# Patient Record
Sex: FEMALE | Race: WHITE | NOT HISPANIC OR LATINO | Employment: OTHER | URBAN - METROPOLITAN AREA
[De-identification: names, ages, dates, MRNs, and addresses within clinical notes are randomized per-mention and may not be internally consistent; named-entity substitution may affect disease eponyms.]

---

## 2017-09-11 ENCOUNTER — RESULTS ENCOUNTER (OUTPATIENT)
Dept: SPORTS MEDICINE | Facility: CLINIC | Age: 52
End: 2017-09-11

## 2017-09-11 ENCOUNTER — OFFICE VISIT (OUTPATIENT)
Dept: SPORTS MEDICINE | Facility: CLINIC | Age: 52
End: 2017-09-11

## 2017-09-11 VITALS
DIASTOLIC BLOOD PRESSURE: 60 MMHG | OXYGEN SATURATION: 98 % | SYSTOLIC BLOOD PRESSURE: 100 MMHG | BODY MASS INDEX: 21.07 KG/M2 | HEIGHT: 68 IN | WEIGHT: 139 LBS | HEART RATE: 70 BPM

## 2017-09-11 DIAGNOSIS — Z12.11 SCREEN FOR COLON CANCER: ICD-10-CM

## 2017-09-11 DIAGNOSIS — Z00.00 PREVENTATIVE HEALTH CARE: Primary | ICD-10-CM

## 2017-09-11 DIAGNOSIS — Z23 IMMUNIZATION DUE: ICD-10-CM

## 2017-09-11 DIAGNOSIS — Z11.59 NEED FOR HEPATITIS C SCREENING TEST: ICD-10-CM

## 2017-09-11 PROCEDURE — 90686 IIV4 VACC NO PRSV 0.5 ML IM: CPT | Performed by: FAMILY MEDICINE

## 2017-09-11 PROCEDURE — 90471 IMMUNIZATION ADMIN: CPT | Performed by: FAMILY MEDICINE

## 2017-09-11 PROCEDURE — 99396 PREV VISIT EST AGE 40-64: CPT | Performed by: FAMILY MEDICINE

## 2017-09-11 NOTE — PROGRESS NOTES
"Tish is a 52 y.o. year old female    Chief Complaint   Patient presents with   • Annual Exam       History of Present Illness   HPI     I have reviewed the patient's medical, family, and social history in detail and updated the computerized patient record.    Review of Systems    /60  Pulse 70  Ht 68\" (172.7 cm)  Wt 139 lb (63 kg)  SpO2 98%  BMI 21.13 kg/m2     Physical Exam     There are no diagnoses linked to this encounter.  "

## 2017-09-11 NOTE — PROGRESS NOTES
"Tish Urias is here today for an annual physical exam.     Eating a healthy diet. Exercising routinely.       I have reviewed the patient's medical, family, and social history in detail and updated the computerized patient record.    Screening history:  Colonoscopy - defers but agrees to Cologuard  Breast cancer, Pap/pelvic - per GYN  Metabolic - last year    Health Maintenance   Topic Date Due   • TDAP/TD VACCINES (1 - Tdap) 08/23/1982   • MAMMOGRAM  03/09/2016   • COLONOSCOPY  03/09/2016   • INFLUENZA VACCINE  08/01/2017   • HEPATITIS C SCREENING  09/11/2017   • PAP SMEAR  09/11/2017       Review of Systems   Constitutional: Negative for activity change, appetite change, chills, diaphoresis, fatigue, fever and unexpected weight change.   HENT: Negative for congestion, ear pain, postnasal drip, rhinorrhea, sinus pressure, sneezing, sore throat and trouble swallowing.    Eyes: Negative for visual disturbance.   Respiratory: Negative for cough, chest tightness, shortness of breath and wheezing.    Cardiovascular: Negative for chest pain and palpitations.   Gastrointestinal: Negative for abdominal pain, blood in stool, nausea and vomiting.   Endocrine: Negative for cold intolerance, polydipsia, polyphagia and polyuria.   Genitourinary: Negative for dysuria, flank pain, frequency, hematuria and urgency.   Musculoskeletal: Negative for arthralgias, back pain, joint swelling and myalgias.   Skin: Negative for rash.   Allergic/Immunologic: Negative for environmental allergies.   Neurological: Negative for dizziness, syncope, weakness, numbness and headaches.   Hematological: Negative for adenopathy. Does not bruise/bleed easily.   Psychiatric/Behavioral: Negative for agitation, decreased concentration, dysphoric mood, sleep disturbance and suicidal ideas. The patient is not nervous/anxious.        /60  Pulse 70  Ht 68\" (172.7 cm)  Wt 139 lb (63 kg)  SpO2 98%  BMI 21.13 kg/m2     Physical Exam    Vital signs " reviewed.  General appearance: No acute distress  Eyes: conjunctiva clear without erythema; pupils equally round and reactive  ENT: external ears and nose normal; hearing normal, oropharynx clear  Neck: supple; no thyromegaly  CV: normal rate and rhythm; no peripheral edema  Respiratory: normal respiratory effort; lungs clear to auscultation bilaterally  MSK: normal gait and station; no focal joint deformity or swelling  Skin: no rash or wounds; normal turgor  Neuro: cranial nerves 2-12 grossly intact; normal sensation to light touch  Psych: mood and affect normal; recent and remote memory intact    Tish was seen today for annual exam.    Diagnoses and all orders for this visit:    Preventative health care  -     CBC & Differential  -     Comprehensive Metabolic Panel  -     Lipid Panel With / Chol / HDL Ratio  -     T4, Free  -     TSH  -     UA / M With / Rflx Culture([facility] ONLY)  -     Hepatitis C Antibody    Screen for colon cancer  -     Cologuard; Future    Need for hepatitis C screening test  -     Hepatitis C Antibody      Encourage healthy diet and exercise.  Encourage patient to stay up to date on screening examinations as indicated based on age and risk factors.

## 2017-09-13 LAB
ALBUMIN SERPL-MCNC: 4.6 G/DL (ref 3.5–5.2)
ALBUMIN/GLOB SERPL: 2.1 G/DL
ALP SERPL-CCNC: 94 U/L (ref 39–117)
ALT SERPL-CCNC: 19 U/L (ref 1–33)
APPEARANCE UR: CLEAR
AST SERPL-CCNC: 19 U/L (ref 1–32)
BACTERIA #/AREA URNS HPF: NORMAL /HPF
BACTERIA UR CULT: NORMAL
BACTERIA UR CULT: NORMAL
BASOPHILS # BLD AUTO: 0.06 10*3/MM3 (ref 0–0.2)
BASOPHILS NFR BLD AUTO: 1.3 % (ref 0–1.5)
BILIRUB SERPL-MCNC: 1.1 MG/DL (ref 0.1–1.2)
BILIRUB UR QL STRIP: NEGATIVE
BUN SERPL-MCNC: 14 MG/DL (ref 6–20)
BUN/CREAT SERPL: 17.3 (ref 7–25)
CALCIUM SERPL-MCNC: 9.5 MG/DL (ref 8.6–10.5)
CHLORIDE SERPL-SCNC: 101 MMOL/L (ref 98–107)
CHOLEST SERPL-MCNC: 197 MG/DL (ref 0–200)
CHOLEST/HDLC SERPL: 3.58 {RATIO}
CO2 SERPL-SCNC: 28.8 MMOL/L (ref 22–29)
COLOR UR: YELLOW
CREAT SERPL-MCNC: 0.81 MG/DL (ref 0.57–1)
EOSINOPHIL # BLD AUTO: 0.1 10*3/MM3 (ref 0–0.7)
EOSINOPHIL NFR BLD AUTO: 2.2 % (ref 0.3–6.2)
EPI CELLS #/AREA URNS HPF: NORMAL /HPF
ERYTHROCYTE [DISTWIDTH] IN BLOOD BY AUTOMATED COUNT: 12.4 % (ref 11.7–13)
GLOBULIN SER CALC-MCNC: 2.2 GM/DL
GLUCOSE SERPL-MCNC: 91 MG/DL (ref 65–99)
GLUCOSE UR QL: NEGATIVE
HCT VFR BLD AUTO: 41.6 % (ref 35.6–45.5)
HCV AB S/CO SERPL IA: 0.2 S/CO RATIO (ref 0–0.9)
HDLC SERPL-MCNC: 55 MG/DL (ref 40–60)
HGB BLD-MCNC: 13.5 G/DL (ref 11.9–15.5)
HGB UR QL STRIP: NEGATIVE
IMM GRANULOCYTES # BLD: 0 10*3/MM3 (ref 0–0.03)
IMM GRANULOCYTES NFR BLD: 0 % (ref 0–0.5)
KETONES UR QL STRIP: NEGATIVE
LDLC SERPL CALC-MCNC: 112 MG/DL (ref 0–100)
LEUKOCYTE ESTERASE UR QL STRIP: ABNORMAL
LYMPHOCYTES # BLD AUTO: 1.74 10*3/MM3 (ref 0.9–4.8)
LYMPHOCYTES NFR BLD AUTO: 38.1 % (ref 19.6–45.3)
MCH RBC QN AUTO: 31.1 PG (ref 26.9–32)
MCHC RBC AUTO-ENTMCNC: 32.5 G/DL (ref 32.4–36.3)
MCV RBC AUTO: 95.9 FL (ref 80.5–98.2)
MICRO URNS: ABNORMAL
MONOCYTES # BLD AUTO: 0.43 10*3/MM3 (ref 0.2–1.2)
MONOCYTES NFR BLD AUTO: 9.4 % (ref 5–12)
NEUTROPHILS # BLD AUTO: 2.24 10*3/MM3 (ref 1.9–8.1)
NEUTROPHILS NFR BLD AUTO: 49 % (ref 42.7–76)
NITRITE UR QL STRIP: NEGATIVE
PH UR STRIP: 6.5 [PH] (ref 5–7.5)
PLATELET # BLD AUTO: 214 10*3/MM3 (ref 140–500)
POTASSIUM SERPL-SCNC: 4.3 MMOL/L (ref 3.5–5.2)
PROT SERPL-MCNC: 6.8 G/DL (ref 6–8.5)
PROT UR QL STRIP: NEGATIVE
RBC # BLD AUTO: 4.34 10*6/MM3 (ref 3.9–5.2)
RBC #/AREA URNS HPF: NORMAL /HPF
SODIUM SERPL-SCNC: 142 MMOL/L (ref 136–145)
SP GR UR: 1.01 (ref 1–1.03)
T4 FREE SERPL-MCNC: 1.26 NG/DL (ref 0.93–1.7)
TRIGL SERPL-MCNC: 152 MG/DL (ref 0–150)
TSH SERPL DL<=0.005 MIU/L-ACNC: 2.91 MIU/ML (ref 0.27–4.2)
URINALYSIS REFLEX: ABNORMAL
UROBILINOGEN UR STRIP-MCNC: 0.2 MG/DL (ref 0.2–1)
VLDLC SERPL CALC-MCNC: 30.4 MG/DL (ref 5–40)
WBC # BLD AUTO: 4.57 10*3/MM3 (ref 4.5–10.7)
WBC #/AREA URNS HPF: NORMAL /HPF

## 2019-11-06 ENCOUNTER — TELEPHONE (OUTPATIENT)
Dept: SPORTS MEDICINE | Facility: CLINIC | Age: 54
End: 2019-11-06

## 2019-11-06 RX ORDER — PREDNISONE 20 MG/1
TABLET ORAL
Qty: 9 TABLET | Refills: 0 | Status: SHIPPED | OUTPATIENT
Start: 2019-11-06 | End: 2019-11-12

## 2019-11-06 NOTE — TELEPHONE ENCOUNTER
Patient called and wants to get a prescription for her poison Ivy that she has not been able to get rid of for a week. Please advise, thank you.

## 2021-11-11 ENCOUNTER — TELEPHONE (OUTPATIENT)
Dept: SPORTS MEDICINE | Facility: CLINIC | Age: 56
End: 2021-11-11

## 2021-11-11 NOTE — TELEPHONE ENCOUNTER
Patient called wanting to make an appointment.  Hasn't seen you since 2017.  She wanted to see if you would still see her for primary care

## 2021-11-18 ENCOUNTER — TELEPHONE (OUTPATIENT)
Dept: SPORTS MEDICINE | Facility: CLINIC | Age: 56
End: 2021-11-18

## 2021-11-18 ENCOUNTER — OFFICE VISIT (OUTPATIENT)
Dept: SPORTS MEDICINE | Facility: CLINIC | Age: 56
End: 2021-11-18

## 2021-11-18 VITALS
WEIGHT: 157 LBS | OXYGEN SATURATION: 98 % | DIASTOLIC BLOOD PRESSURE: 72 MMHG | HEIGHT: 68 IN | RESPIRATION RATE: 16 BRPM | TEMPERATURE: 97.4 F | SYSTOLIC BLOOD PRESSURE: 118 MMHG | BODY MASS INDEX: 23.79 KG/M2 | HEART RATE: 72 BPM

## 2021-11-18 DIAGNOSIS — Z23 FLU VACCINE NEED: ICD-10-CM

## 2021-11-18 DIAGNOSIS — Z12.11 SCREEN FOR COLON CANCER: ICD-10-CM

## 2021-11-18 DIAGNOSIS — Z00.00 PREVENTATIVE HEALTH CARE: Primary | ICD-10-CM

## 2021-11-18 DIAGNOSIS — E01.0 THYROMEGALY: ICD-10-CM

## 2021-11-18 PROCEDURE — 90686 IIV4 VACC NO PRSV 0.5 ML IM: CPT | Performed by: FAMILY MEDICINE

## 2021-11-18 PROCEDURE — 90471 IMMUNIZATION ADMIN: CPT | Performed by: FAMILY MEDICINE

## 2021-11-18 PROCEDURE — 99386 PREV VISIT NEW AGE 40-64: CPT | Performed by: FAMILY MEDICINE

## 2021-11-18 NOTE — TELEPHONE ENCOUNTER
Patient called and wants to know the name of the cream you recommended for her shoulder.  Please advise, thank you.

## 2021-11-18 NOTE — PROGRESS NOTES
"Tish Urias is here today for an annual physical exam.     Her and sister taking care of parents who have dementia     PHQ-2 Depression Screening  Little interest or pleasure in doing things?  0   Feeling down, depressed, or hopeless?  0   PHQ-2 Total Score  0           I have reviewed the patient's medical, family, and social history in detail and updated the computerized patient record.    Screening history:  Colonoscopy - due, prefers Cologuard   Breast cancer, Pap/pelvic - per GYN  Metabolic - last year    Health Maintenance   Topic Date Due   • Annual Gynecologic Pelvic and Breast Exam  Never done   • TDAP/TD VACCINES (1 - Tdap) Never done   • ZOSTER VACCINE (1 of 2) Never done   • MAMMOGRAM  Never done   • PAP SMEAR  Never done   • COLORECTAL CANCER SCREENING  10/17/2017   • ANNUAL PHYSICAL  09/12/2018   • INFLUENZA VACCINE  08/01/2021   • HEPATITIS C SCREENING  Completed   • COVID-19 Vaccine  Completed   • Pneumococcal Vaccine 0-64  Aged Out       Review of Systems    /72 (BP Location: Right arm, Patient Position: Sitting, Cuff Size: Adult)   Pulse 72   Temp 97.4 °F (36.3 °C) (Temporal)   Resp 16   Ht 172.7 cm (67.99\")   Wt 71.2 kg (157 lb)   SpO2 98%   BMI 23.88 kg/m²      Physical Exam    Vital signs reviewed.  General appearance: No acute distress  Eyes: conjunctiva clear without erythema; pupils equally round and reactive  ENT: external ears normal; hearing normal  Neck: supple; + thyromegaly right sided  CV: normal rate and rhythm; no peripheral edema  Respiratory: normal respiratory effort; lungs clear to auscultation bilaterally  MSK: normal gait and station; no focal joint deformity or swelling  Skin: no rash or wounds; normal turgor  Neuro: cranial nerves 2-12 grossly intact; normal sensation to light touch  Psych: mood and affect normal; recent and remote memory intact    No visits with results within 2 Week(s) from this visit.   Latest known visit with results is:   Office Visit on " 09/11/2017   Component Date Value Ref Range Status   • WBC 09/11/2017 4.57  4.50 - 10.70 10*3/mm3 Final   • RBC 09/11/2017 4.34  3.90 - 5.20 10*6/mm3 Final   • Hemoglobin 09/11/2017 13.5  11.9 - 15.5 g/dL Final   • Hematocrit 09/11/2017 41.6  35.6 - 45.5 % Final   • MCV 09/11/2017 95.9  80.5 - 98.2 fL Final   • MCH 09/11/2017 31.1  26.9 - 32.0 pg Final   • MCHC 09/11/2017 32.5  32.4 - 36.3 g/dL Final   • RDW 09/11/2017 12.4  11.7 - 13.0 % Final   • Platelets 09/11/2017 214  140 - 500 10*3/mm3 Final   • Neutrophil Rel % 09/11/2017 49.0  42.7 - 76.0 % Final   • Lymphocyte Rel % 09/11/2017 38.1  19.6 - 45.3 % Final   • Monocyte Rel % 09/11/2017 9.4  5.0 - 12.0 % Final   • Eosinophil Rel % 09/11/2017 2.2  0.3 - 6.2 % Final   • Basophil Rel % 09/11/2017 1.3  0.0 - 1.5 % Final   • Neutrophils Absolute 09/11/2017 2.24  1.90 - 8.10 10*3/mm3 Final   • Lymphocytes Absolute 09/11/2017 1.74  0.90 - 4.80 10*3/mm3 Final   • Monocytes Absolute 09/11/2017 0.43  0.20 - 1.20 10*3/mm3 Final   • Eosinophils Absolute 09/11/2017 0.10  0.00 - 0.70 10*3/mm3 Final   • Basophils Absolute 09/11/2017 0.06  0.00 - 0.20 10*3/mm3 Final   • Immature Granulocyte Rel % 09/11/2017 0.0  0.0 - 0.5 % Final   • Immature Grans Absolute 09/11/2017 0.00  0.00 - 0.03 10*3/mm3 Final   • Glucose 09/11/2017 91  65 - 99 mg/dL Final   • BUN 09/11/2017 14  6 - 20 mg/dL Final   • Creatinine 09/11/2017 0.81  0.57 - 1.00 mg/dL Final   • eGFR Non  Am 09/11/2017 74  >60 mL/min/1.73 Final   • eGFR African Am 09/11/2017 89  >60 mL/min/1.73 Final   • BUN/Creatinine Ratio 09/11/2017 17.3  7.0 - 25.0 Final   • Sodium 09/11/2017 142  136 - 145 mmol/L Final   • Potassium 09/11/2017 4.3  3.5 - 5.2 mmol/L Final   • Chloride 09/11/2017 101  98 - 107 mmol/L Final   • Total CO2 09/11/2017 28.8  22.0 - 29.0 mmol/L Final   • Calcium 09/11/2017 9.5  8.6 - 10.5 mg/dL Final   • Total Protein 09/11/2017 6.8  6.0 - 8.5 g/dL Final   • Albumin 09/11/2017 4.60  3.50 - 5.20 g/dL Final    • Globulin 09/11/2017 2.2  gm/dL Final   • A/G Ratio 09/11/2017 2.1  g/dL Final   • Total Bilirubin 09/11/2017 1.1  0.1 - 1.2 mg/dL Final   • Alkaline Phosphatase 09/11/2017 94  39 - 117 U/L Final   • AST (SGOT) 09/11/2017 19  1 - 32 U/L Final   • ALT (SGPT) 09/11/2017 19  1 - 33 U/L Final   • Total Cholesterol 09/11/2017 197  0 - 200 mg/dL Final   • Triglycerides 09/11/2017 152* 0 - 150 mg/dL Final   • HDL Cholesterol 09/11/2017 55  40 - 60 mg/dL Final   • VLDL Cholesterol 09/11/2017 30.4  5 - 40 mg/dL Final   • LDL Cholesterol  09/11/2017 112* 0 - 100 mg/dL Final   • Chol/HDL Ratio 09/11/2017 3.58   Final   • Free T4 09/11/2017 1.26  0.93 - 1.70 ng/dL Final   • TSH 09/11/2017 2.910  0.270 - 4.200 mIU/mL Final   • Specific Gravity, UA 09/11/2017 1.010  1.005 - 1.030 Final   • pH, UA 09/11/2017 6.5  5.0 - 7.5 Final   • Color, UA 09/11/2017 Yellow  Yellow Final   • Appearance, UA 09/11/2017 Clear  Clear Final   • Leukocytes, UA 09/11/2017 Trace* Negative Final   • Protein 09/11/2017 Negative  Negative/Trace Final   • Glucose, UA 09/11/2017 Negative  Negative Final   • Ketones 09/11/2017 Negative  Negative Final   • Blood, UA 09/11/2017 Negative  Negative Final   • Bilirubin, UA 09/11/2017 Negative  Negative Final   • Urobilinogen, UA 09/11/2017 0.2  0.2 - 1.0 mg/dL Final   • Nitrite, UA 09/11/2017 Negative  Negative Final   • Microscopic Examination 09/11/2017 See below:   Final    Microscopic was indicated and was performed.   • Urinalysis Reflex 09/11/2017 Comment   Final    This specimen has reflexed to a Urine Culture.   • Hep C Virus Ab 09/11/2017 0.2  0.0 - 0.9 s/co ratio Final    Comment:                                   Negative:     < 0.8                               Indeterminate: 0.8 - 0.9                                    Positive:     > 0.9   The CDC recommends that a positive HCV antibody result   be followed up with a HCV Nucleic Acid Amplification   test (020860).     • WBC, UA 09/11/2017 0-5  0  - 5 /hpf Final   • RBC, UA 09/11/2017 None seen  0 - 2 /hpf Final   • Epithelial Cells (non renal) 09/11/2017 0-10  0 - 10 /hpf Final   • Bacteria, UA 09/11/2017 Few  None seen/Few /hpf Final   • Urine Culture 09/11/2017 Final report   Final   • Result 1 09/11/2017 Comment   Final    Comment: Culture shows less than 10,000 colony forming units of bacteria per  milliliter of urine. This colony count is not generally considered  to be clinically significant.           Current Outpatient Medications:   •  cholecalciferol (VITAMIN D3) 1000 UNITS tablet, Take 1,000 Units by mouth daily., Disp: , Rfl:   •  ELDERBERRY PO, Take  by mouth., Disp: , Rfl:   •  Multiple Vitamins-Minerals (MULTI COMPLETE PO), Take  by mouth., Disp: , Rfl:   •  vitamin C (ASCORBIC ACID) 500 MG tablet, Take 500 mg by mouth daily., Disp: , Rfl:     Diagnoses and all orders for this visit:    1. Preventative health care (Primary)  -     CBC & Differential  -     Comprehensive Metabolic Panel  -     Lipid Panel With / Chol / HDL Ratio  -     TSH  -     T4, Free  -     UA / M With / Rflx Culture([facility] ONLY) - Urine, Clean Catch    2. Thyromegaly  -     T3, Free  -     Thyroid Antibodies  -     US thyroid; Future    3. Screen for colon cancer  -     Cologuard - Stool, Per Rectum; Future        Encourage healthy diet and exercise.  Encourage patient to stay up to date on screening examinations as indicated based on age and risk factors.    EMR Dragon/Transcription disclaimer:    Much of this encounter note is an electronic transcription/translation of spoken language to printed text.  The electronic translation of spoken language may permit erroneous, or at times, nonsensical words or phrases to be inadvertently transcribed.  Although I have reviewed the note for such errors some may still exist.

## 2021-11-20 LAB
ALBUMIN SERPL-MCNC: 4.6 G/DL (ref 3.8–4.9)
ALBUMIN/GLOB SERPL: 2.1 {RATIO} (ref 1.2–2.2)
ALP SERPL-CCNC: 96 IU/L (ref 44–121)
ALT SERPL-CCNC: 19 IU/L (ref 0–32)
APPEARANCE UR: CLEAR
AST SERPL-CCNC: 23 IU/L (ref 0–40)
BACTERIA #/AREA URNS HPF: NORMAL /[HPF]
BASOPHILS # BLD AUTO: 0.1 X10E3/UL (ref 0–0.2)
BASOPHILS NFR BLD AUTO: 2 %
BILIRUB SERPL-MCNC: 0.7 MG/DL (ref 0–1.2)
BILIRUB UR QL STRIP: NEGATIVE
BUN SERPL-MCNC: 14 MG/DL (ref 6–24)
BUN/CREAT SERPL: 19 (ref 9–23)
CALCIUM SERPL-MCNC: 9.5 MG/DL (ref 8.7–10.2)
CASTS URNS QL MICRO: NORMAL /LPF
CHLORIDE SERPL-SCNC: 103 MMOL/L (ref 96–106)
CHOLEST SERPL-MCNC: 226 MG/DL (ref 100–199)
CHOLEST/HDLC SERPL: 3.8 RATIO (ref 0–4.4)
CO2 SERPL-SCNC: 24 MMOL/L (ref 20–29)
COLOR UR: YELLOW
CREAT SERPL-MCNC: 0.72 MG/DL (ref 0.57–1)
EOSINOPHIL # BLD AUTO: 0.1 X10E3/UL (ref 0–0.4)
EOSINOPHIL NFR BLD AUTO: 3 %
EPI CELLS #/AREA URNS HPF: NORMAL /HPF (ref 0–10)
ERYTHROCYTE [DISTWIDTH] IN BLOOD BY AUTOMATED COUNT: 12.2 % (ref 11.7–15.4)
GLOBULIN SER CALC-MCNC: 2.2 G/DL (ref 1.5–4.5)
GLUCOSE SERPL-MCNC: 101 MG/DL (ref 65–99)
GLUCOSE UR QL: NEGATIVE
HCT VFR BLD AUTO: 41.8 % (ref 34–46.6)
HDLC SERPL-MCNC: 59 MG/DL
HGB BLD-MCNC: 13.6 G/DL (ref 11.1–15.9)
HGB UR QL STRIP: NEGATIVE
IMM GRANULOCYTES # BLD AUTO: 0 X10E3/UL (ref 0–0.1)
IMM GRANULOCYTES NFR BLD AUTO: 0 %
KETONES UR QL STRIP: NEGATIVE
LDLC SERPL CALC-MCNC: 153 MG/DL (ref 0–99)
LEUKOCYTE ESTERASE UR QL STRIP: NEGATIVE
LYMPHOCYTES # BLD AUTO: 1.7 X10E3/UL (ref 0.7–3.1)
LYMPHOCYTES NFR BLD AUTO: 39 %
MCH RBC QN AUTO: 30.5 PG (ref 26.6–33)
MCHC RBC AUTO-ENTMCNC: 32.5 G/DL (ref 31.5–35.7)
MCV RBC AUTO: 94 FL (ref 79–97)
MICRO URNS: NORMAL
MICRO URNS: NORMAL
MONOCYTES # BLD AUTO: 0.5 X10E3/UL (ref 0.1–0.9)
MONOCYTES NFR BLD AUTO: 11 %
NEUTROPHILS # BLD AUTO: 2 X10E3/UL (ref 1.4–7)
NEUTROPHILS NFR BLD AUTO: 45 %
NITRITE UR QL STRIP: NEGATIVE
PH UR STRIP: 6 [PH] (ref 5–7.5)
PLATELET # BLD AUTO: 290 X10E3/UL (ref 150–450)
POTASSIUM SERPL-SCNC: 4.2 MMOL/L (ref 3.5–5.2)
PROT SERPL-MCNC: 6.8 G/DL (ref 6–8.5)
PROT UR QL STRIP: NEGATIVE
RBC # BLD AUTO: 4.46 X10E6/UL (ref 3.77–5.28)
RBC #/AREA URNS HPF: NORMAL /HPF (ref 0–2)
SODIUM SERPL-SCNC: 142 MMOL/L (ref 134–144)
SP GR UR: 1.02 (ref 1–1.03)
T3FREE SERPL-MCNC: 3.2 PG/ML (ref 2–4.4)
T4 FREE SERPL-MCNC: 1.37 NG/DL (ref 0.82–1.77)
THYROGLOB AB SERPL-ACNC: <1 IU/ML (ref 0–0.9)
THYROPEROXIDASE AB SERPL-ACNC: 10 IU/ML (ref 0–34)
TRIGL SERPL-MCNC: 80 MG/DL (ref 0–149)
TSH SERPL DL<=0.005 MIU/L-ACNC: 2.37 UIU/ML (ref 0.45–4.5)
URINALYSIS REFLEX: NORMAL
UROBILINOGEN UR STRIP-MCNC: 0.2 MG/DL (ref 0.2–1)
VLDLC SERPL CALC-MCNC: 14 MG/DL (ref 5–40)
WBC # BLD AUTO: 4.4 X10E3/UL (ref 3.4–10.8)
WBC #/AREA URNS HPF: NORMAL /HPF (ref 0–5)

## 2021-11-23 DIAGNOSIS — E78.00 ELEVATED CHOLESTEROL: Primary | ICD-10-CM

## 2021-12-15 ENCOUNTER — HOSPITAL ENCOUNTER (OUTPATIENT)
Dept: ULTRASOUND IMAGING | Facility: HOSPITAL | Age: 56
Discharge: HOME | End: 2021-12-15
Admitting: FAMILY MEDICINE

## 2021-12-15 DIAGNOSIS — E01.0 THYROMEGALY: ICD-10-CM

## 2021-12-15 PROCEDURE — 76536 US EXAM OF HEAD AND NECK: CPT

## 2021-12-21 DIAGNOSIS — E04.1 THYROID NODULE: Primary | ICD-10-CM

## 2021-12-22 ENCOUNTER — TELEPHONE (OUTPATIENT)
Dept: SPORTS MEDICINE | Facility: CLINIC | Age: 56
End: 2021-12-22

## 2021-12-22 NOTE — TELEPHONE ENCOUNTER
CALL TO PT TO GIVE APPT DETAILS FOR ENT REFERRAL.      SHE IS GOING ON 1/3/22.     SHE IS ALSO ASKING IF YOU THINK IT'S OK FOR HER TO GO AHEAD AND GET HER COVID BOOSTER SINCE SHE HAD SOME ISSUES PREVIOUSLY?     PLEASE ADVISE.     BSW

## 2021-12-22 NOTE — TELEPHONE ENCOUNTER
LEFT MESSAGE FOR PT TELLING HER THAT DR ALARCON SAID IT SHOULD BE FINE FOR HER TO GET HER BOOSTER.